# Patient Record
Sex: MALE | Race: WHITE | Employment: UNEMPLOYED | URBAN - METROPOLITAN AREA
[De-identification: names, ages, dates, MRNs, and addresses within clinical notes are randomized per-mention and may not be internally consistent; named-entity substitution may affect disease eponyms.]

---

## 2021-04-21 ENCOUNTER — HOSPITAL ENCOUNTER (EMERGENCY)
Age: 26
Discharge: HOME | End: 2021-04-21
Attending: STUDENT IN AN ORGANIZED HEALTH CARE EDUCATION/TRAINING PROGRAM
Payer: MEDICAID

## 2021-04-21 ENCOUNTER — APPOINTMENT (OUTPATIENT)
Dept: GENERAL RADIOLOGY | Age: 26
End: 2021-04-21
Payer: MEDICAID

## 2021-04-21 ENCOUNTER — APPOINTMENT (OUTPATIENT)
Dept: CT IMAGING | Age: 26
End: 2021-04-21
Payer: MEDICAID

## 2021-04-21 VITALS
WEIGHT: 170 LBS | BODY MASS INDEX: 23.8 KG/M2 | RESPIRATION RATE: 19 BRPM | HEART RATE: 91 BPM | SYSTOLIC BLOOD PRESSURE: 127 MMHG | OXYGEN SATURATION: 97 % | HEIGHT: 71 IN | DIASTOLIC BLOOD PRESSURE: 62 MMHG | TEMPERATURE: 99.7 F

## 2021-04-21 DIAGNOSIS — F10.930 ALCOHOL WITHDRAWAL SEIZURE WITHOUT COMPLICATION (HCC): Primary | ICD-10-CM

## 2021-04-21 DIAGNOSIS — R56.9 ALCOHOL WITHDRAWAL SEIZURE WITHOUT COMPLICATION (HCC): Primary | ICD-10-CM

## 2021-04-21 DIAGNOSIS — F11.93 HEROIN WITHDRAWAL (HCC): ICD-10-CM

## 2021-04-21 LAB
ALBUMIN SERPL-MCNC: 4.7 G/DL (ref 3.5–4.6)
ALP BLD-CCNC: 98 U/L (ref 35–104)
ALT SERPL-CCNC: 11 U/L (ref 0–41)
ANION GAP SERPL CALCULATED.3IONS-SCNC: 13 MEQ/L (ref 9–15)
ANISOCYTOSIS: ABNORMAL
APTT: 33.9 SEC (ref 24.4–36.8)
AST SERPL-CCNC: 15 U/L (ref 0–40)
ATYPICAL LYMPHOCYTE RELATIVE PERCENT: 2 %
BANDED NEUTROPHILS RELATIVE PERCENT: 2 %
BASOPHILS ABSOLUTE: 0 K/UL (ref 0–0.2)
BASOPHILS ABSOLUTE: 0 K/UL (ref 0–0.2)
BASOPHILS RELATIVE PERCENT: 0.2 %
BASOPHILS RELATIVE PERCENT: 0.5 %
BILIRUB SERPL-MCNC: 1.2 MG/DL (ref 0.2–0.7)
BUN BLDV-MCNC: 12 MG/DL (ref 6–20)
CALCIUM SERPL-MCNC: 9.8 MG/DL (ref 8.5–9.9)
CHLORIDE BLD-SCNC: 102 MEQ/L (ref 95–107)
CO2: 25 MEQ/L (ref 20–31)
CREAT SERPL-MCNC: 0.86 MG/DL (ref 0.7–1.2)
EOSINOPHILS ABSOLUTE: 0 K/UL (ref 0–0.7)
EOSINOPHILS ABSOLUTE: 0 K/UL (ref 0–0.7)
EOSINOPHILS RELATIVE PERCENT: 0 %
EOSINOPHILS RELATIVE PERCENT: 0.1 %
ETHANOL PERCENT: NORMAL G/DL
ETHANOL: <10 MG/DL (ref 0–0.08)
GFR AFRICAN AMERICAN: >60
GFR NON-AFRICAN AMERICAN: >60
GLOBULIN: 2.5 G/DL (ref 2.3–3.5)
GLUCOSE BLD-MCNC: 87 MG/DL (ref 70–99)
HCT VFR BLD CALC: 43 % (ref 42–52)
HCT VFR BLD CALC: 46.5 % (ref 42–52)
HEMOGLOBIN: 14.4 G/DL (ref 14–18)
HEMOGLOBIN: 15.5 G/DL (ref 14–18)
INR BLD: 1.1
LYMPHOCYTES ABSOLUTE: 1 K/UL (ref 1–4.8)
LYMPHOCYTES ABSOLUTE: 1.8 K/UL (ref 1–4.8)
LYMPHOCYTES RELATIVE PERCENT: 5.7 %
LYMPHOCYTES RELATIVE PERCENT: 6 %
MAGNESIUM: 2 MG/DL (ref 1.7–2.4)
MCH RBC QN AUTO: 25.8 PG (ref 27–31.3)
MCH RBC QN AUTO: 26.2 PG (ref 27–31.3)
MCHC RBC AUTO-ENTMCNC: 33.3 % (ref 33–37)
MCHC RBC AUTO-ENTMCNC: 33.5 % (ref 33–37)
MCV RBC AUTO: 77.6 FL (ref 80–100)
MCV RBC AUTO: 78.1 FL (ref 80–100)
METAMYELOCYTES RELATIVE PERCENT: 1 %
MONOCYTES ABSOLUTE: 0.7 K/UL (ref 0.2–0.8)
MONOCYTES ABSOLUTE: 0.8 K/UL (ref 0.2–0.8)
MONOCYTES RELATIVE PERCENT: 2.9 %
MONOCYTES RELATIVE PERCENT: 4.6 %
NEUTROPHILS ABSOLUTE: 16.2 K/UL (ref 1.4–6.5)
NEUTROPHILS ABSOLUTE: 20.5 K/UL (ref 1.4–6.5)
NEUTROPHILS RELATIVE PERCENT: 87 %
NEUTROPHILS RELATIVE PERCENT: 89.4 %
PDW BLD-RTO: 13.7 % (ref 11.5–14.5)
PDW BLD-RTO: 14 % (ref 11.5–14.5)
PLATELET # BLD: 271 K/UL (ref 130–400)
PLATELET # BLD: 318 K/UL (ref 130–400)
PLATELET SLIDE REVIEW: NORMAL
POTASSIUM SERPL-SCNC: 4 MEQ/L (ref 3.4–4.9)
PROTHROMBIN TIME: 14.1 SEC (ref 12.3–14.9)
RBC # BLD: 5.51 M/UL (ref 4.7–6.1)
RBC # BLD: 5.99 M/UL (ref 4.7–6.1)
SARS-COV-2, NAAT: NOT DETECTED
SMUDGE CELLS: 1
SODIUM BLD-SCNC: 140 MEQ/L (ref 135–144)
TOTAL CK: 54 U/L (ref 0–190)
TOTAL PROTEIN: 7.2 G/DL (ref 6.3–8)
WBC # BLD: 18.1 K/UL (ref 4.8–10.8)
WBC # BLD: 22.8 K/UL (ref 4.8–10.8)

## 2021-04-21 PROCEDURE — 82550 ASSAY OF CK (CPK): CPT

## 2021-04-21 PROCEDURE — 87635 SARS-COV-2 COVID-19 AMP PRB: CPT

## 2021-04-21 PROCEDURE — 80053 COMPREHEN METABOLIC PANEL: CPT

## 2021-04-21 PROCEDURE — 96366 THER/PROPH/DIAG IV INF ADDON: CPT

## 2021-04-21 PROCEDURE — 85730 THROMBOPLASTIN TIME PARTIAL: CPT

## 2021-04-21 PROCEDURE — 85025 COMPLETE CBC W/AUTO DIFF WBC: CPT

## 2021-04-21 PROCEDURE — 83735 ASSAY OF MAGNESIUM: CPT

## 2021-04-21 PROCEDURE — 36415 COLL VENOUS BLD VENIPUNCTURE: CPT

## 2021-04-21 PROCEDURE — 71045 X-RAY EXAM CHEST 1 VIEW: CPT

## 2021-04-21 PROCEDURE — 2500000003 HC RX 250 WO HCPCS: Performed by: STUDENT IN AN ORGANIZED HEALTH CARE EDUCATION/TRAINING PROGRAM

## 2021-04-21 PROCEDURE — 82077 ASSAY SPEC XCP UR&BREATH IA: CPT

## 2021-04-21 PROCEDURE — 2580000003 HC RX 258: Performed by: STUDENT IN AN ORGANIZED HEALTH CARE EDUCATION/TRAINING PROGRAM

## 2021-04-21 PROCEDURE — 96372 THER/PROPH/DIAG INJ SC/IM: CPT

## 2021-04-21 PROCEDURE — 96375 TX/PRO/DX INJ NEW DRUG ADDON: CPT

## 2021-04-21 PROCEDURE — 99284 EMERGENCY DEPT VISIT MOD MDM: CPT

## 2021-04-21 PROCEDURE — 6370000000 HC RX 637 (ALT 250 FOR IP): Performed by: STUDENT IN AN ORGANIZED HEALTH CARE EDUCATION/TRAINING PROGRAM

## 2021-04-21 PROCEDURE — 72125 CT NECK SPINE W/O DYE: CPT

## 2021-04-21 PROCEDURE — 70450 CT HEAD/BRAIN W/O DYE: CPT

## 2021-04-21 PROCEDURE — 96365 THER/PROPH/DIAG IV INF INIT: CPT

## 2021-04-21 PROCEDURE — 85610 PROTHROMBIN TIME: CPT

## 2021-04-21 PROCEDURE — 6360000002 HC RX W HCPCS: Performed by: STUDENT IN AN ORGANIZED HEALTH CARE EDUCATION/TRAINING PROGRAM

## 2021-04-21 RX ORDER — PROMETHAZINE HYDROCHLORIDE 25 MG/ML
25 INJECTION, SOLUTION INTRAMUSCULAR; INTRAVENOUS ONCE
Status: COMPLETED | OUTPATIENT
Start: 2021-04-21 | End: 2021-04-21

## 2021-04-21 RX ORDER — LORAZEPAM 2 MG/1
2 TABLET ORAL EVERY 6 HOURS PRN
Qty: 1 TABLET | Refills: 0 | Status: SHIPPED | OUTPATIENT
Start: 2021-04-21 | End: 2021-04-22

## 2021-04-21 RX ORDER — LORAZEPAM 2 MG/ML
2 INJECTION INTRAMUSCULAR ONCE
Status: COMPLETED | OUTPATIENT
Start: 2021-04-21 | End: 2021-04-21

## 2021-04-21 RX ORDER — CLONIDINE 0.1 MG/24H
1 PATCH, EXTENDED RELEASE TRANSDERMAL ONCE
Status: DISCONTINUED | OUTPATIENT
Start: 2021-04-21 | End: 2021-04-22 | Stop reason: HOSPADM

## 2021-04-21 RX ORDER — 0.9 % SODIUM CHLORIDE 0.9 %
1000 INTRAVENOUS SOLUTION INTRAVENOUS ONCE
Status: DISCONTINUED | OUTPATIENT
Start: 2021-04-21 | End: 2021-04-21

## 2021-04-21 RX ADMIN — THIAMINE HYDROCHLORIDE: 100 INJECTION, SOLUTION INTRAMUSCULAR; INTRAVENOUS at 20:47

## 2021-04-21 RX ADMIN — PROMETHAZINE HYDROCHLORIDE 25 MG: 25 INJECTION, SOLUTION INTRAMUSCULAR; INTRAVENOUS at 20:46

## 2021-04-21 RX ADMIN — LORAZEPAM 2 MG: 2 INJECTION INTRAMUSCULAR; INTRAVENOUS at 20:29

## 2021-04-21 ASSESSMENT — ENCOUNTER SYMPTOMS
CHEST TIGHTNESS: 0
VOMITING: 1
BACK PAIN: 0
COUGH: 0
DIARRHEA: 1
ABDOMINAL PAIN: 0
NAUSEA: 1
SHORTNESS OF BREATH: 0
SINUS PRESSURE: 0
TROUBLE SWALLOWING: 0

## 2021-04-21 ASSESSMENT — PAIN DESCRIPTION - ONSET: ONSET: ON-GOING

## 2021-04-21 ASSESSMENT — PAIN DESCRIPTION - LOCATION: LOCATION: GENERALIZED

## 2021-04-21 ASSESSMENT — PAIN SCALES - GENERAL: PAINLEVEL_OUTOF10: 10

## 2021-04-21 ASSESSMENT — PAIN DESCRIPTION - PAIN TYPE: TYPE: ACUTE PAIN

## 2021-04-21 ASSESSMENT — PAIN DESCRIPTION - DESCRIPTORS: DESCRIPTORS: ACHING

## 2021-04-21 ASSESSMENT — PAIN DESCRIPTION - FREQUENCY: FREQUENCY: CONTINUOUS

## 2021-04-22 NOTE — ED TRIAGE NOTES
Pt presents to ED from home with c/o withdrawal.  Pt admits to withdrawing from IV heroin, as well as alcohol. Pt states that the last time he used both was 2 days ago. Pt's significant other stated that PTA, he had a seizure. Pt denies hx of seizures. Upon assessment, pt is A/Ox4, skin p/w/d, resp even and unlabored, msp's intact. Pt denies cp, sob, fever, or chills; however, admits to n/v and body aches.